# Patient Record
Sex: MALE | URBAN - METROPOLITAN AREA
[De-identification: names, ages, dates, MRNs, and addresses within clinical notes are randomized per-mention and may not be internally consistent; named-entity substitution may affect disease eponyms.]

---

## 2022-05-09 ENCOUNTER — HOSPITAL ENCOUNTER (OUTPATIENT)
Dept: LAB | Age: 67
Discharge: HOME | End: 2022-05-09

## 2022-05-09 PROCEDURE — 88305 TISSUE EXAM BY PATHOLOGIST: CPT

## 2024-09-30 ENCOUNTER — TELEPHONE (OUTPATIENT)
Dept: UROLOGY | Age: 69
End: 2024-09-30

## 2024-09-30 NOTE — TELEPHONE ENCOUNTER
Called pt to r/s, left juan , moved appt to 10/28 and told pt he can call back to r.s if this date or time does not work.

## 2024-10-07 ENCOUNTER — TELEPHONE (OUTPATIENT)
Dept: UROLOGY | Age: 69
End: 2024-10-07

## 2024-10-28 ENCOUNTER — OFFICE VISIT (OUTPATIENT)
Dept: UROLOGY | Age: 69
End: 2024-10-28

## 2024-10-28 DIAGNOSIS — R35.0 BENIGN PROSTATIC HYPERPLASIA WITH URINARY FREQUENCY: ICD-10-CM

## 2024-10-28 DIAGNOSIS — N43.3 RIGHT HYDROCELE: Primary | ICD-10-CM

## 2024-10-28 DIAGNOSIS — N40.1 BENIGN PROSTATIC HYPERPLASIA WITH URINARY FREQUENCY: ICD-10-CM

## 2024-10-28 LAB
BILIRUBIN, URINE, POC: NEGATIVE
BLOOD URINE, POC: NORMAL
GLUCOSE URINE, POC: NEGATIVE MG/DL
KETONES, URINE, POC: NEGATIVE MG/DL
LEUKOCYTE ESTERASE, URINE, POC: NEGATIVE
NITRITE, URINE, POC: NEGATIVE
PH, URINE, POC: 6 (ref 4.6–8)
PROTEIN,URINE, POC: 100 MG/DL
PSA SERPL-MCNC: 1.2 NG/ML (ref 0–4)
SPECIFIC GRAVITY, URINE, POC: 1.02 (ref 1–1.03)
URINALYSIS CLARITY, POC: NORMAL
URINALYSIS COLOR, POC: NORMAL
UROBILINOGEN, POC: NORMAL MG/DL

## 2024-10-28 RX ORDER — GABAPENTIN 800 MG/1
TABLET ORAL
COMMUNITY

## 2024-10-28 RX ORDER — LISINOPRIL 10 MG/1
5 TABLET ORAL DAILY
COMMUNITY

## 2024-10-28 RX ORDER — FUROSEMIDE 40 MG/1
40 TABLET ORAL 2 TIMES DAILY
COMMUNITY

## 2024-10-28 RX ORDER — ALLOPURINOL 300 MG/1
300 TABLET ORAL DAILY
COMMUNITY

## 2024-10-28 RX ORDER — SERTRALINE HYDROCHLORIDE 200 MG/1
CAPSULE ORAL
COMMUNITY

## 2024-10-28 RX ORDER — IBUPROFEN 200 MG
200 TABLET ORAL EVERY 6 HOURS PRN
COMMUNITY

## 2024-10-28 RX ORDER — TAMSULOSIN HYDROCHLORIDE 0.4 MG/1
0.4 CAPSULE ORAL DAILY
COMMUNITY

## 2024-10-28 RX ORDER — METOPROLOL TARTRATE 25 MG/1
12.5 TABLET, FILM COATED ORAL 2 TIMES DAILY
COMMUNITY

## 2024-11-01 ASSESSMENT — ENCOUNTER SYMPTOMS
SHORTNESS OF BREATH: 0
BLOOD IN STOOL: 0
EYE PAIN: 0
NAUSEA: 0
COUGH: 0
ABDOMINAL PAIN: 0
EYE DISCHARGE: 0
SKIN LESIONS: 0
INDIGESTION: 0
CONSTIPATION: 0
VOMITING: 0
HEARTBURN: 0
DIARRHEA: 0
WHEEZING: 0
BACK PAIN: 0

## 2024-11-01 NOTE — PROGRESS NOTES
[facility]  [address]   [address]  [place] [address]  [phone]    Pete Henry  : 1955    Chief Complaint   Patient presents with    New Patient     Hydrocele right          HPI     Pete Henry is a 69 y.o. male    History of Present Illness  The patient presents for evaluation of right testicular swelling.    He reports that his right testicle is swollen, a condition previously evaluated by a [facility] five years ago, who reassured him it was not a cause for concern. He clarifies that the swelling is not painful but has increased in size. He has not undergone an ultrasound of his testicle.    He has been experiencing urinary leakage and urgency, which he describes as a lifelong issue. He notes a numb sensation at the tip of his penis and occasional urine leakage. He is currently on tamsulosin, which he believes is beneficial. He reports no presence of blood in his urine and has not had a recent PSA test.    Additionally, he mentions a foot condition that has prevented him from working for the past six years.    He is also taking sertraline and another medication for his mental health. He has been prescribed furosemide which worsens his issues.     Supplemental Information:  He had cancer on his nose.    FAMILY HISTORY  His father had prostate cancer and bladder cancer.    Lab Results   Component Value Date    PSA 1.2 10/28/2024           Past Medical History:   Diagnosis Date    Diabetes mellitus (HCC)     High cholesterol     Kidney stone        No past surgical history on file.    Current Outpatient Medications   Medication Sig Dispense Refill    allopurinol (ZYLOPRIM) 300 MG tablet Take 1 tablet by mouth daily      metoprolol tartrate (LOPRESSOR) 25 MG tablet Take 0.5 tablets by mouth 2 times daily      tamsulosin (FLOMAX) 0.4 MG capsule Take 1 capsule by mouth daily      lisinopril (PRINIVIL;ZESTRIL) 10 MG tablet Take 0.5 tablets by mouth daily